# Patient Record
Sex: MALE | URBAN - METROPOLITAN AREA
[De-identification: names, ages, dates, MRNs, and addresses within clinical notes are randomized per-mention and may not be internally consistent; named-entity substitution may affect disease eponyms.]

---

## 2017-01-16 NOTE — PATIENT DISCUSSION
(M07.301) Keratoconjunct sicca, not specified as Sjogren's, bilateral - Assesment : Examination revealed Dry Eye Syndrome - Plan : see plan #2

## 2017-01-16 NOTE — PATIENT DISCUSSION
(H53.2) Diplopia - Assesment : Hx of Diplopia. Doing wrll with Prism Rx. - Plan : Continue Prism Rx.

## 2017-01-16 NOTE — PATIENT DISCUSSION
(H43.811) Vitreous degeneration, right eye - Assesment : Examination revealed PVD floater. No changes reported. - Plan : Monitor for changes. Advised patient to call our office with decreased vision or an increase in flashes and/or floaters.

## 2017-01-16 NOTE — PATIENT DISCUSSION
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 - Plan : Recommended Pt to use ATs daily, at least 2-3 times+. AT sample given and handout given on GONSALO. RTC in 1 year for Exam and OCT ONH (Nerve slightly suspicious), sooner if problems or changes occur.

## 2017-01-16 NOTE — PATIENT DISCUSSION
(G33.389) Other secondary cataract, bilateral - Assesment : Posterior capsule opacification present. Pt is bothered and symptomatic with glare and blur. - Plan : Recommended Yag Cap OU to improve visual function. R/B/A's discussed, including risk of RD. All questions answered and handout given. Pt wishes to proceed. Schedule Yag Cap OU.

## 2017-01-16 NOTE — PATIENT DISCUSSION
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 Baseline OCT Mac today. - Plan : Monitor for changes. Follow with Mac OCT prn with Dr's request or vision changes.

## 2017-03-06 NOTE — PATIENT DISCUSSION
(X73.087) Other secondary cataract, bilateral - Assesment : s/p yag cap OU. - Plan : Monitor for changes.

## 2017-03-06 NOTE — PATIENT DISCUSSION
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 ?ABMD.    Pt complains of starburst type glare with headlights which did not improve s/p yag cap. - Plan : ATs daily, at least 2-3+ times and add Gel ATs qhs. Gel AT coupon given today. Call if symptoms do not improve with increased AT use and Gel ATs. RTC as scheduled in January for Exam and OCT [facility] 74 (Nerve slightly suspicious), sooner if problems or changes occur.

## 2017-03-06 NOTE — PATIENT DISCUSSION
(K58.465) Keratoconjunct sicca, not specified as Sjogren's, bilateral - Assesment : Examination revealed Dry Eye Syndrome - Plan : see plan #2

## 2017-11-01 NOTE — PATIENT DISCUSSION
(H53.2) Diplopia - Assesment : Hx of Diplopia. Longstanding. Doing well with Prism Rx in Sunglasses, but new glasses were not made with Prism. - Plan : Monitor. Advised Pt that current Rx were made incorrectly. Recommended Pt to have GLRx remade with correct Rx that includes Prism. Call w/ any vision changes or increased Diplopia.

## 2017-11-01 NOTE — PATIENT DISCUSSION
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 Baseline OCT ONH today. - Plan : Monitor for IOP and NFL changes with visits and testing. RTC in 1 year for Exam, sooner if problems or changes.

## 2018-10-18 NOTE — PATIENT DISCUSSION
(H53.2) Diplopia - Assesment : Hx of Diplopia. Longstanding. Doing well with Prism in GLRx. - Plan : Monitor for changes. Updated GLRX with Prism given today. Call with any vision changes or increased Diplopia.

## 2018-10-18 NOTE — PATIENT DISCUSSION
(Z55.134) Keratoconjunct sicca, not specified as Sjogren's, bilateral - Assesment : Examination revealed Dry Eye Syndrome - Plan : Monitor for changes. GONSALO symptoms explained and recommended regular use of ATs at least 3-4 times per day. Refresh coupon given today. Recommended anti-glare coating on GLRx and recommended discussing needs and complaints with optical before getting GLRx made.

## 2018-10-18 NOTE — PATIENT DISCUSSION
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 OCT ONH performed: stable today. - Plan : Monitor for IOP and NFL changes with visits and OCTs. RTC in 1 year for Exam and OCT ONH, sooner if problems or changes.

## 2018-10-18 NOTE — PATIENT DISCUSSION
Recommended artificial tears to use as directed. Refresh coupon given today. Recommend anti-glare coating on glasses.

## 2020-02-03 ENCOUNTER — IMPORTED ENCOUNTER (OUTPATIENT)
Dept: URBAN - METROPOLITAN AREA CLINIC 50 | Facility: CLINIC | Age: 85
End: 2020-02-03